# Patient Record
Sex: FEMALE | Race: AMERICAN INDIAN OR ALASKA NATIVE
[De-identification: names, ages, dates, MRNs, and addresses within clinical notes are randomized per-mention and may not be internally consistent; named-entity substitution may affect disease eponyms.]

---

## 2019-10-09 ENCOUNTER — HOSPITAL ENCOUNTER (EMERGENCY)
Dept: HOSPITAL 5 - ED | Age: 19
Discharge: HOME | End: 2019-10-09
Payer: COMMERCIAL

## 2019-10-09 VITALS — DIASTOLIC BLOOD PRESSURE: 67 MMHG | SYSTOLIC BLOOD PRESSURE: 118 MMHG

## 2019-10-09 DIAGNOSIS — F17.200: ICD-10-CM

## 2019-10-09 DIAGNOSIS — N12: Primary | ICD-10-CM

## 2019-10-09 LAB
ALBUMIN SERPL-MCNC: 4.2 G/DL (ref 3.9–5)
ALT SERPL-CCNC: 9 UNITS/L (ref 7–56)
BACTERIA #/AREA URNS HPF: (no result) /HPF
BASOPHILS # (AUTO): 0 K/MM3 (ref 0–0.1)
BASOPHILS NFR BLD AUTO: 0.2 % (ref 0–1.8)
BILIRUB UR QL STRIP: (no result)
BLOOD UR QL VISUAL: (no result)
BUN SERPL-MCNC: 10 MG/DL (ref 7–17)
BUN/CREAT SERPL: 13 %
CALCIUM SERPL-MCNC: 9.4 MG/DL (ref 8.4–10.2)
EOSINOPHIL # BLD AUTO: 0 K/MM3 (ref 0–0.4)
EOSINOPHIL NFR BLD AUTO: 0 % (ref 0–4.3)
HCT VFR BLD CALC: 38.2 % (ref 30.3–42.9)
HEMOLYSIS INDEX: 0
HGB BLD-MCNC: 12.2 GM/DL (ref 10.1–14.3)
LYMPHOCYTES # BLD AUTO: 1.5 K/MM3 (ref 1.2–5.4)
LYMPHOCYTES NFR BLD AUTO: 9.2 % (ref 13.4–35)
MCHC RBC AUTO-ENTMCNC: 32 % (ref 30–34)
MCV RBC AUTO: 77 FL (ref 79–97)
MONOCYTES # (AUTO): 1.4 K/MM3 (ref 0–0.8)
MONOCYTES % (AUTO): 8.6 % (ref 0–7.3)
PH UR STRIP: 5 [PH] (ref 5–7)
PLATELET # BLD: 239 K/MM3 (ref 140–440)
RBC # BLD AUTO: 4.97 M/MM3 (ref 3.65–5.03)
RBC #/AREA URNS HPF: 18 /HPF (ref 0–6)
UROBILINOGEN UR-MCNC: 2 MG/DL (ref ?–2)
WBC #/AREA URNS HPF: > 182 /HPF (ref 0–6)

## 2019-10-09 PROCEDURE — 99284 EMERGENCY DEPT VISIT MOD MDM: CPT

## 2019-10-09 PROCEDURE — 81001 URINALYSIS AUTO W/SCOPE: CPT

## 2019-10-09 PROCEDURE — 85025 COMPLETE CBC W/AUTO DIFF WBC: CPT

## 2019-10-09 PROCEDURE — 36415 COLL VENOUS BLD VENIPUNCTURE: CPT

## 2019-10-09 PROCEDURE — 96365 THER/PROPH/DIAG IV INF INIT: CPT

## 2019-10-09 PROCEDURE — 71046 X-RAY EXAM CHEST 2 VIEWS: CPT

## 2019-10-09 PROCEDURE — 80053 COMPREHEN METABOLIC PANEL: CPT

## 2019-10-09 PROCEDURE — 96375 TX/PRO/DX INJ NEW DRUG ADDON: CPT

## 2019-10-09 PROCEDURE — 96361 HYDRATE IV INFUSION ADD-ON: CPT

## 2019-10-09 PROCEDURE — 83690 ASSAY OF LIPASE: CPT

## 2019-10-09 PROCEDURE — 84703 CHORIONIC GONADOTROPIN ASSAY: CPT

## 2019-10-09 NOTE — EVENT NOTE
ED Screening Note


Date of service: 10/09/19


Time: 13:08


ED Screening Note: 


19 y/o female comes in for fever body aches right lower abd pain and urinary 

frequency times 2 day. 





This initial assessment/diagnostic orders/clinical plan/treatment(s) is/are 

subject to change based on patients health status, clinical progression and re-

assessment by fellow clinical providers in the ED. Further treatment and workup 

at subsequent clinical providers discretion. Patient/guardian urged not to elope

from the ED as their condition may be serious if not clinically assessed and 

managed. 





Initial orders include:

## 2019-10-09 NOTE — EMERGENCY DEPARTMENT REPORT
HPI





- General


Chief Complaint: Fever


Time Seen by Provider: 10/09/19 13:07





- HPI


HPI: 





19 YO COMES TO ER WITH 3 DAY HX OF MUCkLE ACHES AND PAINS. SHE VOMITING LAST 

NIGHT. SHE HAS FEVER ON ADMIT TO ER. NO COUGH PER PT. NO ABD PAIN. POS HEAD 

ACHE. NO VAG DC OR BLEEDING. LMP 4 DAYS AGO. NO DYSURIA. DOES STATE MY BACK JUST

HURTS WITH ACHING. 


NOT CONCERNED FOR STI OR PREG








ED Past Medical Hx





- Past Medical History


Previous Medical History?: No


Hx Hypertension: No


Hx CVA: No


Hx Heart Attack/AMI: No


Hx Congestive Heart Failure: No


Hx Diabetes: No


Hx Deep Vein Thrombosis: No


Hx Pulmonary Embolism: No


Hx GERD: No


Hx Liver Disease: No


Hx Renal Disease: No


Hx of Cancer: No


Hx Sickle Cell Disease: No


Hx Arthritis: No


Hx Headaches / Migraines: No


Hx Seizures: No


Hx Kidney Stones: No


Hx Psychiatric Treatment: No


Hx Asthma: No


Hx COPD: No


Hx Tuberculosis: No


Hx Dementia: No


Hx HIV: No





- Surgical History


Past Surgical History?: No


Hx Coronary Stent: No


Hx Open Heart Surgery: No


Hx Pacemaker: No


Hx Internal Defibrillator: No


Hx Cholecystectomy: No


Hx Appendectomy: No


Hx Breast Surgery: No





- Family History


Family history: no significant





- Social History


Smoking Status: Current Every Day Smoker


Substance Use Type: Marijuana





- Medications


Home Medications: 


                                Home Medications











 Medication  Instructions  Recorded  Confirmed  Last Taken  Type


 


Fluconazole [Diflucan TAB] 100 mg PO QDAY #1 tablet 10/09/19  Unknown Rx


 


Ibuprofen [Motrin] 800 mg PO Q8HR PRN #30 tablet 10/09/19  Unknown Rx


 


Ondansetron [Zofran Odt] 4 mg PO Q8HR PRN #10 tab.rapdis 10/09/19  Unknown Rx


 


Sulfamethoxazole/Trimethoprim 1 each PO BID #10 tablet 10/09/19  Unknown Rx





[Bactrim DS TAB]     














ED Review of Systems


ROS: 


Stated complaint: MERLYN/HEADACHE/VOMITING


Other details as noted in HPI





Comment: All other systems reviewed and negative





Physical Exam





- Physical Exam


Vital Signs: 


                                   Vital Signs











  10/09/19 10/09/19





  12:07 13:13


 


Temperature 101.8 F H 


 


Pulse Rate 127 H 


 


Respiratory 16 18





Rate  


 


Blood Pressure 126/68 


 


O2 Sat by Pulse 97 





Oximetry  











Physical Exam: 





ALERT AND ORIENTED


NO FOCAL DEFICIT


S1S2


TACHYCARDIA


LUNGS CTA


ABD SNT


POS L CVA TENDERNESS


NO N/V IN ER


AMBULATORY


TAKING PO


perrl


mood and affect appropriate


full ROM of all joints





ED Course


                                   Vital Signs











  10/09/19 10/09/19





  12:07 13:13


 


Temperature 101.8 F H 


 


Pulse Rate 127 H 


 


Respiratory 16 18





Rate  


 


Blood Pressure 126/68 


 


O2 Sat by Pulse 97 





Oximetry  














ED Medical Decision Making





- Lab Data


Result diagrams: 


                                 10/09/19 13:38





                                 10/09/19 13:38





- Radiology Data


Radiology results: report reviewed, image reviewed





- Medical Decision Making





                                   Vital Signs











  10/09/19 10/09/19





  12:07 13:13


 


Temperature 101.8 F H 


 


Pulse Rate 127 H 


 


Respiratory 16 18





Rate  


 


Blood Pressure 126/68 


 


O2 Sat by Pulse 97 





Oximetry  








Labs











  10/09/19 10/09/19 10/09/19





  13:38 13:38 13:38


 


WBC  16.4 H  


 


RBC  4.97  


 


Hgb  12.2  


 


Hct  38.2  


 


MCV  77 L  


 


MCH  25 L  


 


MCHC  32  


 


RDW  14.5  


 


Plt Count  239  


 


Lymph % (Auto)  9.2 L  


 


Mono % (Auto)  8.6 H  


 


Eos % (Auto)  0.0  


 


Baso % (Auto)  0.2  


 


Lymph #  1.5  


 


Mono #  1.4 H  


 


Eos #  0.0  


 


Baso #  0.0  


 


Seg Neutrophils %  82.0 H  


 


Seg Neutrophils #  13.4 H  


 


Sodium   136 L 


 


Potassium   3.7 


 


Chloride   97.6 L 


 


Carbon Dioxide   25 


 


Anion Gap   17 


 


BUN   10 


 


Creatinine   0.8 


 


Estimated GFR   > 60 


 


BUN/Creatinine Ratio   13 


 


Glucose   105 H 


 


Calcium   9.4 


 


Total Bilirubin   1.30 H 


 


AST   11 


 


ALT   9 


 


Alkaline Phosphatase   79 


 


Total Protein   8.3 H 


 


Albumin   4.2 


 


Albumin/Globulin Ratio   1.0 


 


Lipase   18 


 


HCG, Qual    Negative


 


Urine Color   


 


Urine Turbidity   


 


Urine pH   


 


Ur Specific Gravity   


 


Urine Protein   


 


Urine Glucose (UA)   


 


Urine Ketones   


 


Urine Blood   


 


Urine Nitrite   


 


Urine Bilirubin   


 


Urine Urobilinogen   


 


Ur Leukocyte Esterase   


 


Urine WBC (Auto)   


 


Urine RBC (Auto)   


 


U Epithel Cells (Auto)   


 


Urine Bacteria (Auto)   














  10/09/19





  14:58


 


WBC 


 


RBC 


 


Hgb 


 


Hct 


 


MCV 


 


MCH 


 


MCHC 


 


RDW 


 


Plt Count 


 


Lymph % (Auto) 


 


Mono % (Auto) 


 


Eos % (Auto) 


 


Baso % (Auto) 


 


Lymph # 


 


Mono # 


 


Eos # 


 


Baso # 


 


Seg Neutrophils % 


 


Seg Neutrophils # 


 


Sodium 


 


Potassium 


 


Chloride 


 


Carbon Dioxide 


 


Anion Gap 


 


BUN 


 


Creatinine 


 


Estimated GFR 


 


BUN/Creatinine Ratio 


 


Glucose 


 


Calcium 


 


Total Bilirubin 


 


AST 


 


ALT 


 


Alkaline Phosphatase 


 


Total Protein 


 


Albumin 


 


Albumin/Globulin Ratio 


 


Lipase 


 


HCG, Qual 


 


Urine Color  Cleo


 


Urine Turbidity  Turbid


 


Urine pH  5.0


 


Ur Specific Gravity  1.016


 


Urine Protein  100 mg/dl


 


Urine Glucose (UA)  Neg


 


Urine Ketones  20


 


Urine Blood  Sm


 


Urine Nitrite  Pos


 


Urine Bilirubin  Neg


 


Urine Urobilinogen  2.0


 


Ur Leukocyte Esterase  Mod


 


Urine WBC (Auto)  > 182.0 H


 


Urine RBC (Auto)  18.0


 


U Epithel Cells (Auto)  5.0


 


Urine Bacteria (Auto)  2+











NO COUGH


NO SINUS PAIN


NO SORE THROAT OR TOOTH


ABD SNT


POS CVA TENDERNESS


NO VAG DC OR BLEEDING; LMP 4 DAYS AGO


NO VOMITING IN ER





UA NOTED POS WBC AND NITRATES





NS 1L/ROCEPHIN/ZOFRAN AND TORADOL/MOTRIN FOR FEVER


TAKING PO





DC HOME WITH DC PLAN OF CARE AND PCP FOLLOW UP


On dc taking PO without difficulty; ambulatory; no back pain; no abd pain. 

urinating with normalizing vs


                                   Vital Signs











  10/09/19 10/09/19 10/09/19





  12:07 13:13 16:44


 


Temperature 101.8 F H  98.0 F


 


Pulse Rate 127 H  103 H


 


Respiratory 16 18 18





Rate   


 


Blood Pressure 126/68  


 


Blood Pressure   118/67





[Left]   


 


O2 Sat by Pulse 97  100





Oximetry   














- Differential Diagnosis


INFECTION - URINARY/ABD/RESP


Critical care attestation.: 


If time is entered above; I have spent that time in minutes in the direct care 

of this critically ill patient, excluding procedure time.








ED Disposition


Clinical Impression: 


 Pyelonephritis, Fever





Disposition: DC-01 TO HOME OR SELFCARE


Is pt being admited?: No


Condition: Stable


Instructions:  Acute Pyelonephritis (ED)


Additional Instructions: 


MEDS AS ORDERED TODAY





HYDRATE WELL WITH WATER





FOLLOW UP WITH PCP TO BE SURE THIS IS GETTING BETTER


IT IS A SIGNIFICANT INFECTION





DIET AND ACTIVITY AS TOLERATED








Prescriptions: 


Sulfamethoxazole/Trimethoprim [Bactrim DS TAB] 1 each PO BID #10 tablet


Fluconazole [Diflucan TAB] 100 mg PO QDAY #1 tablet


Ibuprofen [Motrin] 800 mg PO Q8HR PRN #30 tablet


 PRN Reason: Pain, Moderate (4-6)


Ondansetron [Zofran Odt] 4 mg PO Q8HR PRN #10 tab.rapdis


 PRN Reason: Vomiting


Referrals: 


PRIMARY CARE,MD [Primary Care Provider] - 3-5 Days


Forms:  Work/School Release Form(ED)


Time of Disposition: 15:56

## 2019-10-09 NOTE — XRAY REPORT
CHEST 2 VIEWS 



INDICATION:  fever.



COMPARISON:  None



FINDINGS:

Support devices: None.



Heart: Within normal limits. 

Lungs/pleura: No acute air space or interstitial disease.  No pneumothorax.



Additional findings: None.



IMPRESSION:

No acute findings.



Signer Name: Deng Newberry Jr, MD 

Signed: 10/9/2019 3:10 PM

 Workstation Name: [id number]